# Patient Record
Sex: MALE | Race: WHITE | HISPANIC OR LATINO | Employment: FULL TIME | URBAN - METROPOLITAN AREA
[De-identification: names, ages, dates, MRNs, and addresses within clinical notes are randomized per-mention and may not be internally consistent; named-entity substitution may affect disease eponyms.]

---

## 2022-04-13 ENCOUNTER — HOSPITAL ENCOUNTER (OUTPATIENT)
Dept: RADIOLOGY | Facility: MEDICAL CENTER | Age: 42
End: 2022-04-13
Attending: INTERNAL MEDICINE
Payer: COMMERCIAL

## 2022-04-13 DIAGNOSIS — R93.1 ABNORMAL ECHOCARDIOGRAM: ICD-10-CM

## 2022-04-13 PROCEDURE — 700117 HCHG RX CONTRAST REV CODE 255: Performed by: INTERNAL MEDICINE

## 2022-04-13 PROCEDURE — A9577 INJ MULTIHANCE: HCPCS | Performed by: INTERNAL MEDICINE

## 2022-04-13 PROCEDURE — 75561 CARDIAC MRI FOR MORPH W/DYE: CPT

## 2022-04-13 RX ADMIN — GADOBENATE DIMEGLUMINE 14 ML: 529 INJECTION, SOLUTION INTRAVENOUS at 14:20

## 2022-10-10 ENCOUNTER — OFFICE VISIT (OUTPATIENT)
Dept: URGENT CARE | Facility: CLINIC | Age: 42
End: 2022-10-10
Payer: COMMERCIAL

## 2022-10-10 VITALS
TEMPERATURE: 98 F | WEIGHT: 154 LBS | HEIGHT: 64 IN | BODY MASS INDEX: 26.29 KG/M2 | SYSTOLIC BLOOD PRESSURE: 116 MMHG | OXYGEN SATURATION: 94 % | RESPIRATION RATE: 18 BRPM | HEART RATE: 83 BPM | DIASTOLIC BLOOD PRESSURE: 74 MMHG

## 2022-10-10 DIAGNOSIS — H10.32 ACUTE BACTERIAL CONJUNCTIVITIS OF LEFT EYE: ICD-10-CM

## 2022-10-10 DIAGNOSIS — Z91.09 ENVIRONMENTAL ALLERGIES: ICD-10-CM

## 2022-10-10 DIAGNOSIS — J30.9 ALLERGIC RHINITIS, UNSPECIFIED SEASONALITY, UNSPECIFIED TRIGGER: ICD-10-CM

## 2022-10-10 PROCEDURE — 99203 OFFICE O/P NEW LOW 30 MIN: CPT | Performed by: PHYSICIAN ASSISTANT

## 2022-10-10 RX ORDER — HYDROXYZINE HYDROCHLORIDE 10 MG/1
10 TABLET, FILM COATED ORAL 3 TIMES DAILY PRN
COMMUNITY

## 2022-10-10 RX ORDER — DOXYCYCLINE HYCLATE 100 MG
100 TABLET ORAL 2 TIMES DAILY
Qty: 14 TABLET | Refills: 0 | Status: SHIPPED | OUTPATIENT
Start: 2022-10-10 | End: 2022-10-17

## 2022-10-10 RX ORDER — POLYMYXIN B SULFATE AND TRIMETHOPRIM 1; 10000 MG/ML; [USP'U]/ML
1 SOLUTION OPHTHALMIC 4 TIMES DAILY
Qty: 10 ML | Refills: 0 | Status: SHIPPED | OUTPATIENT
Start: 2022-10-10 | End: 2022-10-17

## 2022-10-10 ASSESSMENT — ENCOUNTER SYMPTOMS
PHOTOPHOBIA: 0
DOUBLE VISION: 0
HEADACHES: 1
VOMITING: 0
EYE PAIN: 1
SHORTNESS OF BREATH: 0
EYE REDNESS: 1
EYE DISCHARGE: 1
FEVER: 0
DIZZINESS: 0
BLURRED VISION: 0
SORE THROAT: 1
MYALGIAS: 0
COUGH: 0
CHILLS: 0
NAUSEA: 0

## 2022-10-10 ASSESSMENT — VISUAL ACUITY: OU: 1

## 2022-10-11 NOTE — PROGRESS NOTES
Subjective     Shakeel Rizzo is a 42 y.o. male who presents with Allergic Rhinitis (Itchy eyes, nasal congestion x4days/OTC meds not helping.)    HPI:  Shakeel Rizzo is a 42 y.o. male who presents today for evaluation of allergies.  Patient reports that he has had a significant amount of itchy/watery eyes, nasal congestion, clear rhinorrhea, and itchy throat for the past 5 months.  He states he talked with his primary care doctor who recommended the use of OTC antihistamines and nasal sprays.  Patient reports that he has been using those but it is not helping.  Over the past 4 days symptoms seem to have gotten a little bit worse and he also notes that it is now affecting his eyes more than in the past.  His left eye has been draining thick sticky purulent discharge and he is having trouble keeping it open secondary to light sensitivity.  He has not had any fever/chills.  No cough or shortness of breath.  He does not wear contact lenses.  He has not had any visual changes.        Review of Systems   Constitutional:  Negative for chills and fever.   HENT:  Positive for congestion and sore throat.    Eyes:  Positive for pain, discharge and redness. Negative for blurred vision, double vision and photophobia.   Respiratory:  Negative for cough and shortness of breath.    Gastrointestinal:  Negative for nausea and vomiting.   Musculoskeletal:  Negative for myalgias.   Neurological:  Positive for headaches. Negative for dizziness.   Endo/Heme/Allergies:  Positive for environmental allergies.       PMH:  has no past medical history on file.  MEDS:   Current Outpatient Medications:     hydrOXYzine HCl (ATARAX) 10 MG Tab, Take 10 mg by mouth 3 times a day as needed for Itching., Disp: , Rfl:     doxycycline (VIBRAMYCIN) 100 MG Tab, Take 1 Tablet by mouth 2 times a day for 7 days., Disp: 14 Tablet, Rfl: 0    polymixin-trimethoprim (POLYTRIM) 85181-5.1 UNIT/ML-% Solution, Administer 1 Drop into both eyes 4  "times a day for 7 days., Disp: 10 mL, Rfl: 0  ALLERGIES: Not on File  SURGHX: History reviewed. No pertinent surgical history.  SOCHX:    FH: Family history was reviewed, no pertinent findings to report        Objective     /74 (BP Location: Right arm, Patient Position: Sitting, BP Cuff Size: Adult)   Pulse 83   Temp 36.7 °C (98 °F) (Temporal)   Resp 18   Ht 1.626 m (5' 4\")   Wt 69.9 kg (154 lb)   SpO2 94%   BMI 26.43 kg/m²      Physical Exam  Constitutional:       Appearance: He is well-developed.   HENT:      Head: Normocephalic and atraumatic.      Right Ear: External ear normal.      Left Ear: External ear normal.      Nose: Mucosal edema, congestion and rhinorrhea present. Rhinorrhea is clear.      Mouth/Throat:      Lips: Pink.      Mouth: Mucous membranes are moist.      Pharynx: Oropharynx is clear.   Eyes:      General: Vision grossly intact.         Left eye: Discharge present.No foreign body.      Conjunctiva/sclera:      Right eye: Right conjunctiva is injected.      Left eye: Left conjunctiva is injected. Exudate present.      Pupils: Pupils are equal, round, and reactive to light.      Comments: Left eye with mild swelling of the eyelids.  It is diffusely injected with thick purulent/mucoid discharge   Cardiovascular:      Rate and Rhythm: Normal rate and regular rhythm.      Heart sounds: Normal heart sounds. No murmur heard.  Pulmonary:      Effort: Pulmonary effort is normal.      Breath sounds: Normal breath sounds. No wheezing.   Musculoskeletal:      Cervical back: Normal range of motion.   Lymphadenopathy:      Cervical: No cervical adenopathy.   Skin:     General: Skin is warm and dry.      Capillary Refill: Capillary refill takes less than 2 seconds.   Neurological:      Mental Status: He is alert and oriented to person, place, and time.   Psychiatric:         Behavior: Behavior normal.         Judgment: Judgment normal.         Assessment & Plan       1. Acute bacterial " conjunctivitis of left eye  - cefTRIAXone (Rocephin) 1 g, lidocaine (XYLOCAINE) 1 % 3.6 mL for IM use  - doxycycline (VIBRAMYCIN) 100 MG Tab; Take 1 Tablet by mouth 2 times a day for 7 days.  Dispense: 14 Tablet; Refill: 0  - polymixin-trimethoprim (POLYTRIM) 62698-0.1 UNIT/ML-% Solution; Administer 1 Drop into both eyes 4 times a day for 7 days.  Dispense: 10 mL; Refill: 0    2. Allergic rhinitis, unspecified seasonality, unspecified trigger    3. Environmental allergies      Patient with concerns of allergic rhinitis and hayfever symptoms.  On exam, ever, he is found to have bacterial conjunctivitis of his left eye.  Given the extent of the infection he was treated with 1 g of Rocephin IM in the urgent care setting we will treat with a 7-day course of doxycycline as well as Polytrim eyedrops.  He may apply moist compresses to help remove discharge.      Due to the eye infection discussed that we should hold off on initiating any additional treatment for his allergies.  Giving any type of Kenalog injection for example can suppress his immune system and I do not want the eye infection to get any worse.  Discussed that if he is still having a significant amount of allergy symptoms after the eye infection is cleared up next week that he may return for reevaluation.  Discussed that he can discuss with the provider that see him about reconsidering Kenalog at that time.            Differential Diagnosis, natural history, and supportive care discussed. Return to the Urgent Care or follow up with your PCP if symptoms fail to resolve, or for any new or worsening symptoms. Emergency room precautions discussed. Patient and/or family appears understanding of information.

## 2025-04-01 ENCOUNTER — TELEPHONE (OUTPATIENT)
Dept: CARDIOLOGY | Facility: MEDICAL CENTER | Age: 45
End: 2025-04-01

## 2025-04-08 ENCOUNTER — TELEPHONE (OUTPATIENT)
Dept: CARDIOLOGY | Facility: MEDICAL CENTER | Age: 45
End: 2025-04-08

## 2025-04-08 NOTE — TELEPHONE ENCOUNTER
Chart Prep     Spoke to patient in regards to records for NP appointment with   on [phone]. Patient has never been treated by a cardiologist, all recent records in epic including labs, imaging and cardiac testing. Confirmed appointment date, time and location.

## 2025-04-15 ENCOUNTER — APPOINTMENT (OUTPATIENT)
Dept: CARDIOLOGY | Facility: MEDICAL CENTER | Age: 45
End: 2025-04-15
Attending: INTERNAL MEDICINE
Payer: COMMERCIAL

## 2025-06-03 ENCOUNTER — OFFICE VISIT (OUTPATIENT)
Facility: MEDICAL CENTER | Age: 45
End: 2025-06-03
Attending: INTERNAL MEDICINE
Payer: COMMERCIAL

## 2025-06-03 VITALS
WEIGHT: 140 LBS | HEART RATE: 77 BPM | RESPIRATION RATE: 16 BRPM | OXYGEN SATURATION: 94 % | SYSTOLIC BLOOD PRESSURE: 102 MMHG | BODY MASS INDEX: 24.8 KG/M2 | DIASTOLIC BLOOD PRESSURE: 64 MMHG | HEIGHT: 63 IN

## 2025-06-03 DIAGNOSIS — Z76.89 ENCOUNTER TO ESTABLISH CARE: Primary | ICD-10-CM

## 2025-06-03 LAB — EKG IMPRESSION: NORMAL

## 2025-06-03 PROCEDURE — 93005 ELECTROCARDIOGRAM TRACING: CPT | Mod: TC | Performed by: INTERNAL MEDICINE

## 2025-06-03 PROCEDURE — 3078F DIAST BP <80 MM HG: CPT | Performed by: INTERNAL MEDICINE

## 2025-06-03 PROCEDURE — 93010 ELECTROCARDIOGRAM REPORT: CPT | Performed by: INTERNAL MEDICINE

## 2025-06-03 PROCEDURE — 99202 OFFICE O/P NEW SF 15 MIN: CPT | Performed by: INTERNAL MEDICINE

## 2025-06-03 PROCEDURE — 99203 OFFICE O/P NEW LOW 30 MIN: CPT | Performed by: INTERNAL MEDICINE

## 2025-06-03 PROCEDURE — 3074F SYST BP LT 130 MM HG: CPT | Performed by: INTERNAL MEDICINE

## 2025-06-03 ASSESSMENT — ENCOUNTER SYMPTOMS
CLAUDICATION: 0
HEARTBURN: 0
ALTERED MENTAL STATUS: 0
SYNCOPE: 0
IRREGULAR HEARTBEAT: 0
BACK PAIN: 0
FEVER: 0
DYSPNEA ON EXERTION: 0
DIARRHEA: 0
WEIGHT LOSS: 0
COUGH: 0
DIZZINESS: 0
CONSTIPATION: 0
FLANK PAIN: 0
BLURRED VISION: 0
PND: 0
ABDOMINAL PAIN: 0
VOMITING: 0
WEIGHT GAIN: 0
PALPITATIONS: 0
NAUSEA: 0
ORTHOPNEA: 0
NEAR-SYNCOPE: 0
DEPRESSION: 0
DECREASED APPETITE: 0
SHORTNESS OF BREATH: 0

## 2025-06-03 NOTE — PROGRESS NOTES
Cardiology Note    Chief Complaint   Patient presents with    New Patient       History of Present Illness: Shakeel Rizzo is a 44 y.o. 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     Would like second opinion. Recalls three years ago was driving and felt palpitations. Recalls nicotine and energy drink use at the time. Went to [facility] for evaluation. Had testing completed and told stable. Following this had outpatient visits and underwent cardiac testing leading to finding noncompaction on cMRI at [facility]. Also had remote appointment with specialty clinic in [place] he recalls. More recently has been doing well. No cardiac complaints. Especially no syncope, palpitations and no heart failure symptoms. No toxic social habits. Brother with negative MRI for noncompaction.     Review of Systems   Constitutional: Negative for decreased appetite, fever, malaise/fatigue, weight gain and weight loss.   HENT:  Negative for congestion and nosebleeds.    Eyes:  Negative for blurred vision.   Cardiovascular:  Negative for chest pain, claudication, dyspnea on exertion, irregular heartbeat, leg swelling, near-syncope, orthopnea, palpitations, paroxysmal nocturnal dyspnea and syncope.   Respiratory:  Negative for cough and shortness of breath.    Endocrine: Negative for cold intolerance and heat intolerance.   Skin:  Negative for rash.   Musculoskeletal:  Negative for back pain.   Gastrointestinal:  Negative for abdominal pain, constipation, diarrhea, heartburn, melena, nausea and vomiting.   Genitourinary:  Negative for dysuria, flank pain and hematuria.   Neurological:  Negative for dizziness.   Psychiatric/Behavioral:  Negative for altered mental status and depression.          Past Medical History[1]      Past Surgical History[2]      Current Medications[3]      Allergies[4]      No family history on file.      Social History     Socioeconomic History    Marital status:      Spouse name: Not on file  "   Number of children: Not on file    Years of education: Not on file    Highest education level: Not on file   Occupational History    Not on file   Tobacco Use    Smoking status: Not on file    Smokeless tobacco: Not on file   Substance and Sexual Activity    Alcohol use: Not on file    Drug use: Not on file    Sexual activity: Not on file   Other Topics Concern    Not on file   Social History Narrative    Not on file     Social Drivers of Health     Financial Resource Strain: Not on file   Food Insecurity: Not on file   Transportation Needs: Not on file   Physical Activity: Not on file   Stress: Not on file   Social Connections: Not on file   Intimate Partner Violence: Not on file   Housing Stability: Not on file         Physical Exam:  Ambulatory Vitals  /64 (BP Location: Left arm, Patient Position: Sitting, BP Cuff Size: Adult)   Pulse 77   Resp 16   Ht 1.6 m (5' 3\")   Wt 63.5 kg (140 lb)   SpO2 94%    BP Readings from Last 4 Encounters:   06/03/25 102/64   10/10/22 116/74     Weight/BMI:   Vitals:    06/03/25 1012   BP: 102/64   Weight: 63.5 kg (140 lb)   Height: 1.6 m (5' 3\")    Body mass index is 24.8 kg/m².  Wt Readings from Last 4 Encounters:   06/03/25 63.5 kg (140 lb)   10/10/22 69.9 kg (154 lb)       Physical Exam  Constitutional:       General: He is not in acute distress.  HENT:      Head: Normocephalic and atraumatic.   Eyes:      Conjunctiva/sclera: Conjunctivae normal.      Pupils: Pupils are equal, round, and reactive to light.   Neck:      Vascular: No JVD.   Cardiovascular:      Rate and Rhythm: Normal rate and regular rhythm.      Heart sounds: Normal heart sounds. No murmur heard.     No friction rub. No gallop.   Pulmonary:      Effort: Pulmonary effort is normal. No respiratory distress.      Breath sounds: Normal breath sounds. No wheezing or rales.   Chest:      Chest wall: No tenderness.   Abdominal:      General: Bowel sounds are normal. There is no distension.      " "Palpations: Abdomen is soft.   Musculoskeletal:      Cervical back: Normal range of motion and neck supple.   Skin:     General: Skin is warm and dry.   Neurological:      Mental Status: He is alert and oriented to person, place, and time.   Psychiatric:         Mood and Affect: Affect normal.         Judgment: Judgment normal.         Lab Data Review:  No results found for: \"CHOLSTRLTOT\", \"LDL\", \"HDL\", \"TRIGLYCERIDE\"    Lab Results   Component Value Date/Time    SODIUM 140 06/15/2021 02:08 AM    POTASSIUM 4 06/15/2021 02:08 AM    CHLORIDE 101 06/15/2021 02:08 AM    CO2 28 06/15/2021 02:08 AM    BUN 17 06/15/2021 02:08 AM    CREATININE 1.34 06/15/2021 02:08 AM     CrCl cannot be calculated (Patient's most recent lab result is older than the maximum 7 days allowed.).  Lab Results   Component Value Date/Time    ALKPHOSPHAT 74 06/15/2021 02:08 AM    ASTSGOT 37 06/15/2021 02:08 AM    ALTSGPT 36 06/15/2021 02:08 AM    TBILIRUBIN 0.7 06/15/2021 02:08 AM      Lab Results   Component Value Date/Time    WBC 8 06/15/2021 02:08 AM     No results found for: \"HBA1C\"  No components found for: \"TROP\"      Cardiac Imaging and Procedures Review:      TTE 2/2022  Interpretation Summary   No regional wall motion abnormalities noted.   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 Left ventricular non-compaction. Non-compacted/compacted myocardium ratio of 3:1.     2. Normal left ventricular motion with left ventricular systolic function of 52 %.     3. Normal left atrium and right atrium.     4. No late gadolinium enhancement in both ventricles.    Medical Decision Making:  Problem List Items Addressed This Visit       Encounter to establish care - Primary "    Relevant Orders    EKG (Completed)       Noncompaction - no red flag symptoms / findings. Serial imaging next visit. Screen children; pending visit with children's cards at [facility].     It was my pleasure to meet with Mr. Emi Rizzo.                             [1] No past medical history on file.  [2] No past surgical history on file.  [3]   Current Outpatient Medications   Medication Sig Dispense Refill    hydrOXYzine HCl (ATARAX) 10 MG Tab Take 10 mg by mouth 3 times a day as needed for Itching.       No current facility-administered medications for this visit.   [4] Not on File